# Patient Record
(demographics unavailable — no encounter records)

---

## 2025-06-23 NOTE — HISTORY OF PRESENT ILLNESS
[de-identified] : 33 year old male  ( , plays soccer )   left calf pain since 6/19/25 when pt was playing soccer and felt a 'pop' at calf The pain is located post calf The pain is associated with  point tenderness, tightness, swelling, weakness Worse with activity and better at rest. Has tried ibuprofen, act mod, topicals

## 2025-06-23 NOTE — ASSESSMENT
[FreeTextEntry1] : Left X-Ray Examination of the TIBIA (2 views): there are no fractures, subluxations or dislocations.   Acute complicated injury with calf tear that may require surgery   Due to the patients injury along with calf tenderness, swelling, and weakness with ankle plantarflexion,  we will get an mri of the calf to eval for achilles musculotendinous junction tear vs. plantaris rupture vs. gastroc tear and the possible need for surgery   - We discussed their diagnosis and treatment options at length including the risks and benefits of both surgical and non-surgical options. Surgical risks include but are not limited to pain, infection, bleeding, vascular injury, numbness, tingling, nerve damage. - The patient was advised to apply ice (wrapped in a towel or protective covering) to the area daily (20 minutes at a time, 2-4X/day). - MDP rx - Discussed possible side effects of medication along with timing and frequency for taking - The patient was advised to modify their activities. - will place into Walking boot to allow calf to calm down - f/u after mri

## 2025-06-23 NOTE — IMAGING
[de-identified] :  LEFT CALF Inspection:  swelling Palpation: calf tenderness Knee and Ankle Range of Motion: normal motion but pain with ankle motion Strength:  ankle dorsiflexion strength is 5/5   ankle plantar flexion strength is 3+/5   ankle inversion strength is 5/5   ankle eversion strength is 5/5 Platt test: normal Neurovascular intact distally Gait: antalgic

## 2025-07-09 NOTE — DATA REVIEWED
[MRI] : MRI [Left] : left [Lower Extremities] : lower extremities [I independently reviewed and interpreted images and report] : I independently reviewed and interpreted images and report

## 2025-07-10 NOTE — HISTORY OF PRESENT ILLNESS
Statement Selected [de-identified] : 33 year old male  ( , plays soccer )   left calf pain since 6/19/25 when pt was playing soccer and felt a 'pop' at calf The pain is located post calf The pain is associated with  point tenderness, tightness, swelling, weakness Worse with activity and better at rest. Has tried ibuprofen, act mod, topicals  7/10/25 - had mri, mod activity, sent mdp

## 2025-07-10 NOTE — HISTORY OF PRESENT ILLNESS
[de-identified] : 33 year old male  ( , plays soccer )   left calf pain since 6/19/25 when pt was playing soccer and felt a 'pop' at calf The pain is located post calf The pain is associated with  point tenderness, tightness, swelling, weakness Worse with activity and better at rest. Has tried ibuprofen, act mod, topicals  7/10/25 - had mri, mod activity, sent mdp

## 2025-07-10 NOTE — ASSESSMENT
[FreeTextEntry1] : mri left calf 7/7/25 - low grade tear myotendinous jucniton medial gastroc   - We discussed their diagnosis and treatment options at length including the risks and benefits of both surgical and non-surgical options. Surgical risks include but are not limited to pain, infection, bleeding, vascular injury, numbness, tingling, nerve damage. - Due to risks of surgery, they will continue conservative treatment with PT, icing, and anti-inflammatory medications. - The patient was provided with a prescription for Physical Therapy. - The patient was referred to [facility]. - fu 4-6 week re-eval    Medication Discussion: 1) We discussed a comprehensive treatment plan that included possible pharmaceutical management involving the use of prescription strength medications including but not limited to options such as oral Naprosyn 500mg BID, once daily Meloxicam 15 mg, or 500-650 mg Tylenol versus over the counter oral medications in addition to discussing possible topical prescription Pennsaid vs  Voltaren gel. 2) There is a moderate risk of morbidity with further treatment, especially from use of prescription strength medications and possible side effects of these medications which include but are not limited to upset stomach with oral medications, skin reactions to topical medications and GI/cardiac/renal issues with long term use. 3) I recommended that the patient follow-up with their medical physician if there are any significant potential issues with long term medication use such as interactions with current medications or with exacerbation of underlying medical comorbidities. 4) The benefits and risks associated with use of oral and / or topical prescription and over the counter anti-inflammatory medications were discussed with the patient. The patient voiced understanding of the risks including but not limited to bleeding, stroke, kidney dysfunction, heart disease, and were referred to the black box warning label for further information.

## 2025-07-10 NOTE — IMAGING
sodium bicarbonate 650 mg tablet [[id number]]      Order Details    Dose: 650 mg Route: oral Frequency: 3 times daily   Dispense Quantity: 90 tablet Refills: 0          Sig: Take 1 tablet (650 mg) by mouth 3 times a day.         Please send to [facility]    
[de-identified] : LEFT CALF Inspection:  swelling Palpation: calf tenderness Knee and Ankle Range of Motion: normal motion but pain with ankle motion Strength:  ankle dorsiflexion strength is 5/5   ankle plantar flexion strength is 4/5   ankle inversion strength is 5/5   ankle eversion strength is 5/5 Platt test: normal Neurovascular intact distally Gait: antalgic 
[de-identified] : LEFT CALF Inspection:  swelling Palpation: calf tenderness Knee and Ankle Range of Motion: normal motion but pain with ankle motion Strength:  ankle dorsiflexion strength is 5/5   ankle plantar flexion strength is 4/5   ankle inversion strength is 5/5   ankle eversion strength is 5/5 Platt test: normal Neurovascular intact distally Gait: antalgic